# Patient Record
(demographics unavailable — no encounter records)

---

## 2024-11-18 NOTE — PHYSICAL EXAM
[Wheelchair] : uses a wheelchair [de-identified] : Left Hip:   Pain with attempted range of motion of the hip.  Well-healed scars.  Right Hip: Range of Motion in Degrees: 	                                 Claimant:	   Normal:	 Flexion (Active) 	                 120 	   120-degrees	 Flexion (Passive)	                 120	   120-degrees	 Extension (Active)	                 -30	   -30-degrees	 Extension (Passive)	 -30	   -30-degrees	 Abduction (Active)	                 45-50	   65-06-[id number]	 Abduction (Passive)	 45-50	   23-77-lptiinu	 Adduction (Active)  	 20-30	   20-85-bevuhis	 Adduction (Passive)	 20-30	   97-07-aiglqqm	 Internal Rotation (Active) 	 35	   35-degrees	 Internal Rotation (Passive)	 35	   35-degrees	 External Rotation (Active)	 45	   45-degrees	 External Rotation (Passive)	 45	   45-degrees	  No tenderness with internal or external rotation or axial load.  No tenderness to palpation over the greater trochanter.  Negative Trendelenburg.  No tenderness with resisted abduction.  No weakness to flexion, extension, abduction or adduction.  No evidence of instability.  No motor or sensory deficits.  2+ DP and PT pulses.  Skin is intact.  No scars, rashes or lesions.    Right Knee: Range of Motion in Degrees                      Claimant:    Normal:  Flexion Active     135      135-degrees  Flexion Passive     135     135-degrees  Extension Active     0-5     0-5-degrees  Extension Passive     0-5     0-5-degrees    No weakness to flexion/extension.  No evidence of instability in the AP plane or varus or valgus stress.  Negative Lachman.  Negative pivot shift.  Negative anterior drawer test.  Negative posterior drawer test.  Negative Marissa.  Negative Apley grind.  No medial or lateral joint line tenderness.  No tenderness over the medial and lateral facet of the patella.  No patellofemoral crepitations.  No lateral tilting patella.  No patellar apprehension.  No crepitation in the medial and lateral femoral condyle.  No proximal or distal swelling, edema or tenderness.  No gross motor or sensory deficits.  No intra-articular swelling.  2+ DP and PT pulses. No varus or valgus malalignment.  Skin is intact.  No rashes, scars or lesions.    Left Knee: Range of Motion in Degrees                      Claimant:    Normal:  Flexion Active     135      135-degrees  Flexion Passive     135     135-degrees  Extension Active     0-5     0-5-degrees  Extension Passive     0-5     0-5-degrees    Diffusely tender.  Well-healed scars.    [de-identified] : Appearance:  Well-developed, well-nourished female in no acute distress.   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  No obvious abnormalities. Radiographs, two to three views of the left hip and pelvis taken in the office today, show a periprosthetic fracture in the region of the greater trochanter with no displacement and no evidence of loosening of the components.

## 2024-11-18 NOTE — CONSULT LETTER
[Dear  ___] : Dear  [unfilled], [Consult Letter:] : I had the pleasure of evaluating your patient, [unfilled]. [Please see my note below.] : Please see my note below. [Consult Closing:] : Thank you very much for allowing me to participate in the care of this patient.  If you have any questions, please do not hesitate to contact me. [Sincerely,] : Sincerely, [FreeTextEntry3] : Soham Mays, III, MD [email or web address]
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
## 2024-11-18 NOTE — ADDENDUM
[FreeTextEntry1] : This note was written by Isidoro Gibbs on 11/18/2024, acting as a scribe for Soham Mays III, MD

## 2024-12-10 NOTE — PHYSICAL EXAM
[Wheelchair] : uses a wheelchair [de-identified] : Left Hip:  Limited range of motion, although not too much pain to the hip.  [de-identified] : Appearance:  Well-developed, well-nourished female in no acute distress.   [de-identified] : Review of radiographs of the left hip shows healing periprosthetic fracture.

## 2024-12-10 NOTE — HISTORY OF PRESENT ILLNESS
[de-identified] : The patient comes in today for her left hip.  She is in a wheelchair.  She states she is feeling fine.

## 2024-12-10 NOTE — DISCUSSION/SUMMARY
[de-identified] : At this time, due to healing left hip periprosthetic fracture, I recommended to continue non-weightbearing.  She will be reassessed in 3 weeks, at which point, most likely we will start her on weightbearing as tolerated.

## 2024-12-10 NOTE — PHYSICAL EXAM
[Wheelchair] : uses a wheelchair [de-identified] : Left Hip:  Limited range of motion, although not too much pain to the hip.  [de-identified] : Appearance:  Well-developed, well-nourished female in no acute distress.   [de-identified] : Review of radiographs of the left hip shows healing periprosthetic fracture.

## 2024-12-10 NOTE — DISCUSSION/SUMMARY
[de-identified] : At this time, due to healing left hip periprosthetic fracture, I recommended to continue non-weightbearing.  She will be reassessed in 3 weeks, at which point, most likely we will start her on weightbearing as tolerated.

## 2024-12-10 NOTE — HISTORY OF PRESENT ILLNESS
[de-identified] : The patient comes in today for her left hip.  She is in a wheelchair.  She states she is feeling fine.

## 2025-01-16 NOTE — ADDENDUM
[FreeTextEntry1] : This note was written by Diana Whitley on 01/16/2025 acting as scribe for Soham Mays III, MD

## 2025-01-16 NOTE — DISCUSSION/SUMMARY
[de-identified] : The patient presents with a left hip periprosthetic fracture.  At this time, I recommend she begin weightbearing as tolerated and be reassessed in six weeks.

## 2025-01-16 NOTE — PHYSICAL EXAM
[de-identified] : Left Hip: She has no tenderness to internal and external rotation or axial load.    [de-identified] : Gait/Station:  She uses a wheelchair. [de-identified] : Appearance:  Well-developed, well-nourished female in no acute distress.   [de-identified] : Review of radiographs show healing of her fracture, no change to the implant.